# Patient Record
(demographics unavailable — no encounter records)

---

## 2025-02-05 NOTE — ASSESSMENT
[FreeTextEntry1] : 70 female status post fall, with right nondisplaced angulated comminuted distal radius fracture We discussed the fracture alignment and treatment options, including operative and nonoperative means.  We discussed the benefits and risks of each. Given the displacement and fracture pattern, I recommend operative intervention to obtain an anatomic reduction and provide sufficient stability for early motion and to avoid loss of reduction. I described the risks and benefits including infection, injury to tendons, nerves and vessels, stiffness, need for plate removal, hardware failure and loss of reduction, to name a few. Advised the patient that I will be away on a family emergency within 48 hours and thus will not be available to perform her operation, if she were to decide on the operative management. Therefore, asked patient to follow up with her PCP for a referral to a local hand surgeon for evaluation and management.

## 2025-02-05 NOTE — REASON FOR VISIT
[Source: ______] : History obtained from [unfilled] [Interpreters_IDNumber] : [id number] [Interpreters_FullName] : Jamal

## 2025-02-05 NOTE — REVIEW OF SYSTEMS
[As Noted in HPI] : as noted in HPI [Joint Swelling] : joint swelling [Limb Pain] : limb pain [Limb Swelling] : limb swelling [Negative] : Heme/Lymph

## 2025-02-05 NOTE — PHYSICAL EXAM
[NI] : Normal [de-identified] : Right upper extremity: Examination of the upper EXTR is remarkable for intact well molded sugar-tong splint, mild swelling distally in the phalanges, active to make a partial fist, cap refills are brisk and sensation intact distally across all digits.

## 2025-02-05 NOTE — HISTORY OF PRESENT ILLNESS
[FreeTextEntry1] : 70-year-old female presents for evaluation.   Briefly, she is status post fall back in January 23 and presented to the ER with pain and swelling in the right upper extremity.  Subsequent evaluation included imaging was remarkable for displaced and comminuted right distal radius fracture. Fracture was reduced, and she was splinted and advised to follow-up with me today Denies numbness and tingling the finger No other associated injuries. Denies any previous history of trauma or falls Is retired, lives at home, plays volleyball occasionally and other mundane activities.